# Patient Record
Sex: FEMALE | Race: OTHER | NOT HISPANIC OR LATINO | Employment: STUDENT | URBAN - METROPOLITAN AREA
[De-identification: names, ages, dates, MRNs, and addresses within clinical notes are randomized per-mention and may not be internally consistent; named-entity substitution may affect disease eponyms.]

---
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
## 2021-01-04 ENCOUNTER — OFFICE VISIT (OUTPATIENT)
Dept: INTERNAL MEDICINE | Facility: CLINIC | Age: 21
End: 2021-01-04

## 2021-01-04 VITALS
BODY MASS INDEX: 27.46 KG/M2 | WEIGHT: 155 LBS | DIASTOLIC BLOOD PRESSURE: 62 MMHG | HEIGHT: 63 IN | HEART RATE: 63 BPM | SYSTOLIC BLOOD PRESSURE: 100 MMHG | RESPIRATION RATE: 16 BRPM | TEMPERATURE: 97.3 F | OXYGEN SATURATION: 100 %

## 2021-01-04 DIAGNOSIS — Z30.011 ENCOUNTER FOR INITIAL PRESCRIPTION OF CONTRACEPTIVE PILLS: ICD-10-CM

## 2021-01-04 DIAGNOSIS — Z23 NEED FOR HPV VACCINATION: ICD-10-CM

## 2021-01-04 DIAGNOSIS — Z23 NEED FOR TDAP VACCINATION: ICD-10-CM

## 2021-01-04 DIAGNOSIS — L20.82 FLEXURAL ECZEMA: ICD-10-CM

## 2021-01-04 DIAGNOSIS — Z00.00 ANNUAL PHYSICAL EXAM: Primary | ICD-10-CM

## 2021-01-04 DIAGNOSIS — Z30.41 ENCOUNTER FOR SURVEILLANCE OF CONTRACEPTIVE PILLS: ICD-10-CM

## 2021-01-04 LAB
BILIRUB BLD-MCNC: NEGATIVE MG/DL
CLARITY, POC: CLEAR
COLOR UR: YELLOW
GLUCOSE UR STRIP-MCNC: NEGATIVE MG/DL
KETONES UR QL: NEGATIVE
LEUKOCYTE EST, POC: NEGATIVE
NITRITE UR-MCNC: NEGATIVE MG/ML
PH UR: 7 [PH] (ref 5–8)
PROT UR STRIP-MCNC: NEGATIVE MG/DL
RBC # UR STRIP: NEGATIVE /UL
SP GR UR: 1.02 (ref 1–1.03)
UROBILINOGEN UR QL: NORMAL

## 2021-01-04 PROCEDURE — 81003 URINALYSIS AUTO W/O SCOPE: CPT | Performed by: FAMILY MEDICINE

## 2021-01-04 PROCEDURE — 99395 PREV VISIT EST AGE 18-39: CPT | Performed by: FAMILY MEDICINE

## 2021-01-04 PROCEDURE — 90715 TDAP VACCINE 7 YRS/> IM: CPT | Performed by: FAMILY MEDICINE

## 2021-01-04 PROCEDURE — 90471 IMMUNIZATION ADMIN: CPT | Performed by: FAMILY MEDICINE

## 2021-01-04 PROCEDURE — 90472 IMMUNIZATION ADMIN EACH ADD: CPT | Performed by: FAMILY MEDICINE

## 2021-01-04 PROCEDURE — 90651 9VHPV VACCINE 2/3 DOSE IM: CPT | Performed by: FAMILY MEDICINE

## 2021-01-04 RX ORDER — LEVONORGESTREL / ETHINYL ESTRADIOL AND ETHINYL ESTRADIOL 150-30(84)
1 KIT ORAL DAILY
Qty: 91 EACH | Refills: 3 | Status: SHIPPED | OUTPATIENT
Start: 2021-01-04 | End: 2021-12-15

## 2021-01-04 RX ORDER — PIMECROLIMUS 10 MG/G
CREAM TOPICAL 2 TIMES DAILY
Qty: 60 G | Refills: 1 | Status: SHIPPED | OUTPATIENT
Start: 2021-01-04 | End: 2023-03-27 | Stop reason: SDUPTHER

## 2021-01-04 NOTE — PROGRESS NOTES
Patient Name: Janay Gustafson is a 20 y.o. female presenting for Annual Exam, Contraception, and Eczema      Well Adult Physical   Patient here for a comprehensive physical exam.The patient reports problems - eczema    Do you take any herbs or supplements that were not prescribed by a doctor? no   Are you taking calcium supplements? no   Are you taking aspirin daily? no     History:  Any STD's in the past? none    Janay Mcdaniel 20 y.o. female who presents for an Annual Wellness Visit.  she has a history of   Patient Active Problem List   Diagnosis   • Seasonal allergies   .  she has been doing well with new interval problems.      Health Habits:  Dental Exam. up to date  Eye Exam. up to date  Exercise: 2 times/week.  Current exercise activities include: aerobics, running/ jogging and walking    The following portions of the patient's history were reviewed and updated as appropriate: allergies, current medications, past family history, past medical history, past social history, past surgical history and problem list.    Review of Systems   Constitutional: Negative.    HENT: Negative.    Eyes: Negative.    Respiratory: Negative.    Cardiovascular: Negative.    Gastrointestinal: Negative.    Endocrine: Negative.    Genitourinary: Negative.    Musculoskeletal: Negative.    Skin: Positive for rash.   Allergic/Immunologic: Negative.    Neurological: Negative.    Hematological: Negative.    Psychiatric/Behavioral: Negative.        Patient has no known allergies.      Current Outpatient Medications:   •  Fexofenadine HCl (ALLEGRA PO), Take  by mouth., Disp: , Rfl:   •  Levonorgest-Eth Estrad 91-Day 0.15-0.03 &0.01 MG tablet, Take 1 tablet by mouth Daily., Disp: 91 each, Rfl: 3  •  pimecrolimus (Elidel) 1 % cream, Apply  topically to the appropriate area as directed 2 (Two) Times a Day., Disp: 60 g, Rfl: 1    OBJECTIVE    /62 (BP Location: Left arm, Patient Position: Sitting, Cuff Size: Adult)    "Pulse 63   Temp 97.3 °F (36.3 °C) (Temporal)   Resp 16   Ht 160 cm (63\")   Wt 70.3 kg (155 lb)   SpO2 100%   BMI 27.46 kg/m²     Physical Exam  Vitals signs and nursing note reviewed.   Constitutional:       Appearance: Normal appearance. She is well-developed.   HENT:      Head: Normocephalic and atraumatic.      Right Ear: Tympanic membrane and external ear normal.      Left Ear: Tympanic membrane and external ear normal.      Nose: Nose normal.      Mouth/Throat:      Mouth: Mucous membranes are moist.   Eyes:      General: No scleral icterus.     Extraocular Movements: Extraocular movements intact.      Conjunctiva/sclera: Conjunctivae normal.      Pupils: Pupils are equal, round, and reactive to light.   Neck:      Musculoskeletal: Normal range of motion and neck supple.      Thyroid: No thyromegaly.   Cardiovascular:      Rate and Rhythm: Normal rate and regular rhythm.      Heart sounds: Normal heart sounds. No murmur. No friction rub. No gallop.    Pulmonary:      Effort: Pulmonary effort is normal. No respiratory distress.      Breath sounds: Normal breath sounds. No wheezing or rales.   Abdominal:      General: Bowel sounds are normal.      Palpations: Abdomen is soft.   Musculoskeletal:         General: No deformity.   Lymphadenopathy:      Cervical: No cervical adenopathy.   Skin:     General: Skin is warm and dry.      Findings: Rash (arms with rough texture of cubital fossae with surrounding hypopigmentation) present.   Neurological:      General: No focal deficit present.      Mental Status: She is alert and oriented to person, place, and time.      Cranial Nerves: No cranial nerve deficit.      Motor: No abnormal muscle tone.      Coordination: Coordination normal.      Deep Tendon Reflexes: Reflexes are normal and symmetric. Reflexes normal.   Psychiatric:         Mood and Affect: Mood normal.         Behavior: Behavior normal.         Thought Content: Thought content normal.         Judgment: " Judgment normal.           ASSESSMENT AND PLAN  Update vaccines if indicated.    continue current medications, continue current healthy lifestyle patterns and return for routine annual checkups    Diagnoses and all orders for this visit:    1. Annual physical exam (Primary)  -     POCT urinalysis dipstick, automated    2. Encounter for surveillance of contraceptive pills  -     Levonorgest-Eth Estrad 91-Day 0.15-0.03 &0.01 MG tablet; Take 1 tablet by mouth Daily.  Dispense: 91 each; Refill: 3    3. Flexural eczema  -     pimecrolimus (Elidel) 1 % cream; Apply  topically to the appropriate area as directed 2 (Two) Times a Day.  Dispense: 60 g; Refill: 1    4. Encounter for initial prescription of contraceptive pills  -     Levonorgest-Eth Estrad 91-Day 0.15-0.03 &0.01 MG tablet; Take 1 tablet by mouth Daily.  Dispense: 91 each; Refill: 3    Start HPV series.  Tdap today as well.    [unfilled]    Return in about 1 year (around 1/4/2022).

## 2021-01-15 ENCOUNTER — TELEPHONE (OUTPATIENT)
Dept: INTERNAL MEDICINE | Facility: CLINIC | Age: 21
End: 2021-01-15

## 2021-01-15 NOTE — TELEPHONE ENCOUNTER
PATIENTS MOM STATES THAT  GAVE DAUGHTER A CREAM AT LAST VISIT BUT THEY WERE UNABLE TO FILL BECAUSE THERE WAS A PA THAT NEEDED TO BE DONE. IT WAS NEVER COMPLETED SO THE PHARMACY CANCELLED RX.    PLEASE ADVISE  [phone] MOTHER

## 2021-01-16 NOTE — TELEPHONE ENCOUNTER
Pimecrolius cream approved.  PA# [id number].  Called in info and reapproved med to [facility] [phone].  Patient mother advised.

## 2021-07-01 ENCOUNTER — TELEPHONE (OUTPATIENT)
Dept: INTERNAL MEDICINE | Facility: CLINIC | Age: 21
End: 2021-07-01

## 2021-07-08 ENCOUNTER — CLINICAL SUPPORT (OUTPATIENT)
Dept: INTERNAL MEDICINE | Facility: CLINIC | Age: 21
End: 2021-07-08

## 2021-07-08 DIAGNOSIS — Z23 NEED FOR HPV VACCINATION: Primary | ICD-10-CM

## 2021-07-08 PROCEDURE — 90651 9VHPV VACCINE 2/3 DOSE IM: CPT | Performed by: FAMILY MEDICINE

## 2021-07-08 PROCEDURE — 90471 IMMUNIZATION ADMIN: CPT | Performed by: FAMILY MEDICINE

## 2021-12-14 DIAGNOSIS — Z30.41 ENCOUNTER FOR SURVEILLANCE OF CONTRACEPTIVE PILLS: ICD-10-CM

## 2021-12-14 DIAGNOSIS — Z30.011 ENCOUNTER FOR INITIAL PRESCRIPTION OF CONTRACEPTIVE PILLS: ICD-10-CM

## 2021-12-15 RX ORDER — LEVONORGESTREL AND ETHINYL ESTRADIOL AND ETHINYL ESTRADIOL 150-30(84)
KIT ORAL
Qty: 91 EACH | Refills: 0 | Status: SHIPPED | OUTPATIENT
Start: 2021-12-15 | End: 2022-02-24 | Stop reason: SDUPTHER

## 2022-02-24 ENCOUNTER — OFFICE VISIT (OUTPATIENT)
Dept: INTERNAL MEDICINE | Facility: CLINIC | Age: 22
End: 2022-02-24

## 2022-02-24 VITALS
BODY MASS INDEX: 32.46 KG/M2 | WEIGHT: 183.2 LBS | DIASTOLIC BLOOD PRESSURE: 65 MMHG | HEIGHT: 63 IN | OXYGEN SATURATION: 99 % | HEART RATE: 86 BPM | TEMPERATURE: 97.7 F | SYSTOLIC BLOOD PRESSURE: 105 MMHG | RESPIRATION RATE: 16 BRPM

## 2022-02-24 DIAGNOSIS — Z00.00 ANNUAL PHYSICAL EXAM: Primary | ICD-10-CM

## 2022-02-24 DIAGNOSIS — Z30.011 ENCOUNTER FOR INITIAL PRESCRIPTION OF CONTRACEPTIVE PILLS: ICD-10-CM

## 2022-02-24 DIAGNOSIS — N92.0 MENORRHAGIA WITH REGULAR CYCLE: ICD-10-CM

## 2022-02-24 DIAGNOSIS — Z30.41 ENCOUNTER FOR SURVEILLANCE OF CONTRACEPTIVE PILLS: ICD-10-CM

## 2022-02-24 PROCEDURE — 99395 PREV VISIT EST AGE 18-39: CPT | Performed by: FAMILY MEDICINE

## 2022-02-24 RX ORDER — LEVONORGESTREL / ETHINYL ESTRADIOL AND ETHINYL ESTRADIOL 150-30(84)
1 KIT ORAL DAILY
Qty: 91 EACH | Refills: 3 | Status: SHIPPED | OUTPATIENT
Start: 2022-02-24 | End: 2023-03-09

## 2022-02-24 NOTE — PROGRESS NOTES
Chief Complaint   Patient presents with   • Annual Exam       Patient Name: Janay Gustafson is a 22 y.o. female presenting for Annual Exam      Well Adult Physical   Patient here for a comprehensive physical exam.The patient reports no problems    Do you take any herbs or supplements that were not prescribed by a doctor? no   Are you taking calcium supplements? no   Are you taking aspirin daily? no     History:  Any STD's in the past? none  No prior sexual activity.    Janay Mcdaniel 22 y.o. female who presents for an Annual Wellness Visit.  she has a history of   Patient Active Problem List   Diagnosis   • Seasonal allergies   • Menorrhagia with regular cycle   .  she has been doing well with new interval problems.      Health Habits:  Dental Exam. up to date  Eye Exam. up to date  Exercise: 2 times/week.  Current exercise activities include: cardiovascular workout on exercise equipment and walking      The following portions of the patient's history were reviewed and updated as appropriate: allergies, current medications, past family history, past medical history, past social history, past surgical history and problem list.    Review of Systems   Constitutional: Negative.    HENT: Negative.    Eyes: Negative.    Respiratory: Negative.    Cardiovascular: Negative.    Gastrointestinal: Negative.    Endocrine: Negative.    Genitourinary: Negative.    Musculoskeletal: Negative.    Skin: Negative.    Allergic/Immunologic: Positive for environmental allergies.   Neurological: Negative.    Hematological: Negative.    Psychiatric/Behavioral: Negative.        Patient has no known allergies.      Current Outpatient Medications:   •  Levonorgest-Eth Estrad 91-Day (Ashlyna) 0.15-0.03 &0.01 MG tablet, Take 1 tablet by mouth Daily., Disp: 91 each, Rfl: 3  •  Fexofenadine HCl (ALLEGRA PO), Take  by mouth., Disp: , Rfl:   •  pimecrolimus (Elidel) 1 % cream, Apply  topically to the appropriate area as directed 2  "(Two) Times a Day., Disp: 60 g, Rfl: 1    OBJECTIVE    /65   Pulse 86   Temp 97.7 °F (36.5 °C)   Resp 16   Ht 160 cm (62.99\")   Wt 83.1 kg (183 lb 3.2 oz)   SpO2 99%   BMI 32.46 kg/m²     Physical Exam  Vitals and nursing note reviewed.   Constitutional:       Appearance: Normal appearance. She is well-developed.   HENT:      Head: Normocephalic and atraumatic.      Right Ear: Tympanic membrane and external ear normal.      Left Ear: Tympanic membrane and external ear normal.      Nose: Nose normal.      Mouth/Throat:      Mouth: Mucous membranes are moist.   Eyes:      General: No scleral icterus.     Extraocular Movements: Extraocular movements intact.      Conjunctiva/sclera: Conjunctivae normal.      Pupils: Pupils are equal, round, and reactive to light.   Neck:      Thyroid: No thyromegaly.   Cardiovascular:      Rate and Rhythm: Normal rate and regular rhythm.      Heart sounds: Normal heart sounds. No murmur heard.  No friction rub. No gallop.    Pulmonary:      Effort: Pulmonary effort is normal. No respiratory distress.      Breath sounds: Normal breath sounds. No wheezing or rales.   Abdominal:      General: Bowel sounds are normal.      Palpations: Abdomen is soft.      Tenderness: There is no abdominal tenderness.   Musculoskeletal:         General: No deformity.      Cervical back: Normal range of motion and neck supple.      Right lower leg: No edema.      Left lower leg: No edema.   Lymphadenopathy:      Cervical: No cervical adenopathy.   Skin:     General: Skin is warm and dry.      Findings: No rash.   Neurological:      Mental Status: She is alert and oriented to person, place, and time.      Cranial Nerves: No cranial nerve deficit.      Motor: No abnormal muscle tone.      Coordination: Coordination normal.      Deep Tendon Reflexes: Reflexes are normal and symmetric. Reflexes normal.   Psychiatric:         Behavior: Behavior normal.         Thought Content: Thought content normal. "         Judgment: Judgment normal.              [unfilled]    ASSESSMENT AND PLAN  Diagnoses and all orders for this visit:    1. Annual physical exam (Primary)    2. Encounter for initial prescription of contraceptive pills  -     Levonorgest-Eth Estrad 91-Day (Ashlyna) 0.15-0.03 &0.01 MG tablet; Take 1 tablet by mouth Daily.  Dispense: 91 each; Refill: 3    3. Encounter for surveillance of contraceptive pills  -     Levonorgest-Eth Estrad 91-Day (Ashlyna) 0.15-0.03 &0.01 MG tablet; Take 1 tablet by mouth Daily.  Dispense: 91 each; Refill: 3    4. Menorrhagia with regular cycle      Tdap UTD.  HPV vaccines UTD.  No hx sexual activity; will hold off on pap smear this year.  OCPs refilled; still effective for menorrhagia and dysmenorrhea.    Discussed healthy diet, exercise, cancer screening, immunizations, and preventive care.  Hm tab updated.  Encouraged seat belt use.  No texting while driving. Orders placed as below.     Encouraged covid vaccination if not already done.  Covid vaccination can be scheduled at [email or web address]    continue current medications, continue current healthy lifestyle patterns and return for routine annual checkups    [unfilled]    Return in about 1 year (around 2/24/2023).

## 2023-03-08 DIAGNOSIS — Z30.41 ENCOUNTER FOR SURVEILLANCE OF CONTRACEPTIVE PILLS: ICD-10-CM

## 2023-03-08 DIAGNOSIS — Z30.011 ENCOUNTER FOR INITIAL PRESCRIPTION OF CONTRACEPTIVE PILLS: ICD-10-CM

## 2023-03-08 NOTE — TELEPHONE ENCOUNTER
Rx Refill Note  Requested Prescriptions     Pending Prescriptions Disp Refills    Ashlyna 0.15-0.03 &0.01 MG tablet [Pharmacy Med Name: Ashlyna 0.15-0.03 &0.01 MG Oral Tablet]  0     Sig: Take 1 tablet by mouth once daily      Last office visit with prescribing clinician: 2/24/2022   Last telemedicine visit with prescribing clinician: 3/27/2023   Next office visit with prescribing clinician: 3/27/2023                         Would you like a call back once the refill request has been completed: [] Yes [] No    If the office needs to give you a call back, can they leave a voicemail: [] Yes [] No    Arabella Handy MA  03/08/23, 13:37 EST

## 2023-03-09 RX ORDER — LEVONORGESTREL AND ETHINYL ESTRADIOL AND ETHINYL ESTRADIOL 150-30(84)
KIT ORAL
Qty: 91 EACH | Refills: 0 | Status: SHIPPED | OUTPATIENT
Start: 2023-03-09 | End: 2023-03-27 | Stop reason: SDUPTHER

## 2023-03-27 ENCOUNTER — OFFICE VISIT (OUTPATIENT)
Dept: INTERNAL MEDICINE | Facility: CLINIC | Age: 23
End: 2023-03-27
Payer: COMMERCIAL

## 2023-03-27 VITALS
WEIGHT: 194.4 LBS | TEMPERATURE: 98 F | DIASTOLIC BLOOD PRESSURE: 72 MMHG | HEART RATE: 113 BPM | SYSTOLIC BLOOD PRESSURE: 120 MMHG | BODY MASS INDEX: 35.77 KG/M2 | HEIGHT: 62 IN | OXYGEN SATURATION: 97 % | RESPIRATION RATE: 18 BRPM

## 2023-03-27 DIAGNOSIS — L20.82 FLEXURAL ECZEMA: ICD-10-CM

## 2023-03-27 DIAGNOSIS — Z30.011 ENCOUNTER FOR INITIAL PRESCRIPTION OF CONTRACEPTIVE PILLS: ICD-10-CM

## 2023-03-27 DIAGNOSIS — Z00.00 ANNUAL PHYSICAL EXAM: Primary | ICD-10-CM

## 2023-03-27 DIAGNOSIS — N92.0 MENORRHAGIA WITH REGULAR CYCLE: ICD-10-CM

## 2023-03-27 DIAGNOSIS — J30.2 SEASONAL ALLERGIES: ICD-10-CM

## 2023-03-27 DIAGNOSIS — Z30.41 ENCOUNTER FOR SURVEILLANCE OF CONTRACEPTIVE PILLS: ICD-10-CM

## 2023-03-27 PROCEDURE — 99395 PREV VISIT EST AGE 18-39: CPT | Performed by: FAMILY MEDICINE

## 2023-03-27 RX ORDER — LEVONORGESTREL / ETHINYL ESTRADIOL AND ETHINYL ESTRADIOL 150-30(84)
1 KIT ORAL DAILY
Qty: 91 EACH | Refills: 3 | Status: SHIPPED | OUTPATIENT
Start: 2023-03-27

## 2023-03-27 RX ORDER — PIMECROLIMUS 10 MG/G
CREAM TOPICAL 2 TIMES DAILY
Qty: 60 G | Refills: 2 | Status: SHIPPED | OUTPATIENT
Start: 2023-03-27

## 2023-03-27 NOTE — PROGRESS NOTES
Chief Complaint   Patient presents with   • Annual Exam       Patient Name: Janay Gustafson is a 23 y.o. female presenting for Annual Exam      Well Adult Physical   Patient here for a comprehensive physical exam.The patient reports no problems    Do you take any herbs or supplements that were not prescribed by a doctor? no   Are you taking calcium supplements? no   Are you taking aspirin daily? no     History:  Any STD's in the past? none  No prior sexual activity.    Janay Mcdaniel 23 y.o. female who presents for an Annual Wellness Visit.  she has a history of   Patient Active Problem List   Diagnosis   • Seasonal allergies   • Menorrhagia with regular cycle   • Flexural eczema   .  she has been doing well with new interval problems.      Health Habits:  Dental Exam. up to date  Eye Exam. up to date  Exercise: 2 times/week.  Current exercise activities include: cardiovascular workout on exercise equipment and walking      The following portions of the patient's history were reviewed and updated as appropriate: allergies, current medications, past family history, past medical history, past social history, past surgical history and problem list.    Review of Systems   Constitutional: Negative.    HENT: Negative.    Eyes: Negative.    Respiratory: Negative.    Cardiovascular: Negative.    Gastrointestinal: Negative.    Endocrine: Negative.    Genitourinary: Negative.    Musculoskeletal: Negative.    Skin: Negative.    Allergic/Immunologic: Positive for environmental allergies.   Neurological: Negative.    Hematological: Negative.    Psychiatric/Behavioral: Negative.        Patient has no known allergies.      Current Outpatient Medications:   •  Fexofenadine HCl (ALLEGRA PO), Take  by mouth., Disp: , Rfl:   •  Levonorgest-Eth Estrad 91-Day (Ashlyna) 0.15-0.03 &0.01 MG tablet, Take 1 tablet by mouth Daily., Disp: 91 each, Rfl: 3  •  pimecrolimus (Elidel) 1 % cream, Apply  topically to the appropriate  "area as directed 2 (Two) Times a Day., Disp: 60 g, Rfl: 2    OBJECTIVE    /72   Pulse 113   Temp 98 °F (36.7 °C)   Resp 18   Ht 157.5 cm (62\")   Wt 88.2 kg (194 lb 6.4 oz)   SpO2 97%   BMI 35.56 kg/m²     Physical Exam  Vitals and nursing note reviewed.   Constitutional:       Appearance: Normal appearance. She is well-developed.   HENT:      Head: Normocephalic and atraumatic.      Right Ear: Tympanic membrane and external ear normal.      Left Ear: Tympanic membrane and external ear normal.      Nose: Nose normal.      Mouth/Throat:      Mouth: Mucous membranes are moist.   Eyes:      General: No scleral icterus.     Extraocular Movements: Extraocular movements intact.      Conjunctiva/sclera: Conjunctivae normal.      Pupils: Pupils are equal, round, and reactive to light.   Neck:      Thyroid: No thyromegaly.   Cardiovascular:      Rate and Rhythm: Normal rate and regular rhythm.      Heart sounds: Normal heart sounds. No murmur heard.    No friction rub. No gallop.   Pulmonary:      Effort: Pulmonary effort is normal. No respiratory distress.      Breath sounds: Normal breath sounds. No wheezing or rales.   Abdominal:      General: Bowel sounds are normal.      Palpations: Abdomen is soft.      Tenderness: There is no abdominal tenderness.   Musculoskeletal:         General: No deformity.      Cervical back: Normal range of motion and neck supple.      Right lower leg: No edema.      Left lower leg: No edema.   Lymphadenopathy:      Cervical: No cervical adenopathy.   Skin:     General: Skin is warm and dry.      Findings: No rash.   Neurological:      General: No focal deficit present.      Mental Status: She is alert and oriented to person, place, and time.      Cranial Nerves: No cranial nerve deficit.      Motor: No abnormal muscle tone.      Coordination: Coordination normal.      Deep Tendon Reflexes: Reflexes are normal and symmetric. Reflexes normal.   Psychiatric:         Mood and Affect: " Mood normal.         Behavior: Behavior normal.         Thought Content: Thought content normal.         Judgment: Judgment normal.         ASSESSMENT AND PLAN  Diagnoses and all orders for this visit:    1. Annual physical exam (Primary)    2. Menorrhagia with regular cycle    3. Seasonal allergies    4. Flexural eczema  -     pimecrolimus (Elidel) 1 % cream; Apply  topically to the appropriate area as directed 2 (Two) Times a Day.  Dispense: 60 g; Refill: 2    5. Encounter for initial prescription of contraceptive pills  -     Levonorgest-Eth Estrad 91-Day (Ashlyna) 0.15-0.03 &0.01 MG tablet; Take 1 tablet by mouth Daily.  Dispense: 91 each; Refill: 3    6. Encounter for surveillance of contraceptive pills  -     Levonorgest-Eth Estrad 91-Day (Ashlyna) 0.15-0.03 &0.01 MG tablet; Take 1 tablet by mouth Daily.  Dispense: 91 each; Refill: 3      Tdap UTD.  HPV vaccines UTD.  No hx sexual activity; will hold off on pap smear this year.  OCPs refilled; still effective for menorrhagia and dysmenorrhea.    Elidel refilled for eczema.    CONRAD.  Continue antihistamine.  Add nasal if/when needed.    Discussed healthy diet, exercise, cancer screening, immunizations, and preventive care.  Hm tab updated.  Encouraged seat belt use.  No texting while driving. Orders placed as below.     Encouraged covid vaccination if not already done.  Covid vaccination can be scheduled at [email or web address]    continue current medications, continue current healthy lifestyle patterns and return for routine annual checkups      Return in about 1 year (around 3/27/2024).

## 2024-03-28 ENCOUNTER — OFFICE VISIT (OUTPATIENT)
Dept: INTERNAL MEDICINE | Facility: CLINIC | Age: 24
End: 2024-03-28
Payer: COMMERCIAL

## 2024-03-28 VITALS
TEMPERATURE: 97.3 F | BODY MASS INDEX: 37.54 KG/M2 | DIASTOLIC BLOOD PRESSURE: 68 MMHG | HEART RATE: 117 BPM | HEIGHT: 62 IN | OXYGEN SATURATION: 97 % | SYSTOLIC BLOOD PRESSURE: 92 MMHG | WEIGHT: 204 LBS

## 2024-03-28 DIAGNOSIS — E04.9 ENLARGED THYROID: ICD-10-CM

## 2024-03-28 DIAGNOSIS — Z30.011 ENCOUNTER FOR INITIAL PRESCRIPTION OF CONTRACEPTIVE PILLS: ICD-10-CM

## 2024-03-28 DIAGNOSIS — L20.82 FLEXURAL ECZEMA: ICD-10-CM

## 2024-03-28 DIAGNOSIS — Z30.41 ENCOUNTER FOR SURVEILLANCE OF CONTRACEPTIVE PILLS: ICD-10-CM

## 2024-03-28 DIAGNOSIS — Z00.00 ANNUAL PHYSICAL EXAM: Primary | ICD-10-CM

## 2024-03-28 PROCEDURE — 99395 PREV VISIT EST AGE 18-39: CPT | Performed by: FAMILY MEDICINE

## 2024-03-28 RX ORDER — PIMECROLIMUS 10 MG/G
CREAM TOPICAL 2 TIMES DAILY
Qty: 60 G | Refills: 2 | Status: SHIPPED | OUTPATIENT
Start: 2024-03-28

## 2024-03-28 RX ORDER — LEVONORGESTREL / ETHINYL ESTRADIOL AND ETHINYL ESTRADIOL 150-30(84)
1 KIT ORAL DAILY
Qty: 91 EACH | Refills: 3 | Status: SHIPPED | OUTPATIENT
Start: 2024-03-28

## 2024-03-28 NOTE — PROGRESS NOTES
Chief Complaint   Patient presents with    Annual Exam       Patient Name: Janay Gustafson is a 24 y.o. female presenting for Annual Exam      Well Adult Physical   Patient here for a comprehensive physical exam.The patient reports no problems    Do you take any herbs or supplements that were not prescribed by a doctor? no   Are you taking calcium supplements? no   Are you taking aspirin daily? no     History:  Any STD's in the past? none  Janay Mcdaniel 24 y.o. female who presents for an Annual Wellness Visit.  she has a history of   Patient Active Problem List   Diagnosis    Seasonal allergies    Menorrhagia with regular cycle    Flexural eczema   .  she has been doing well with new interval problems.      Health Habits:  Dental Exam. up to date  Eye Exam. up to date  Exercise: 3 times/week.  Current exercise activities include: walking      The following portions of the patient's history were reviewed and updated as appropriate: allergies, current medications, past family history, past medical history, past social history, past surgical history and problem list.    Review of Systems   Constitutional: Negative.    HENT: Negative.     Eyes: Negative.    Respiratory: Negative.     Cardiovascular: Negative.    Gastrointestinal: Negative.    Endocrine: Negative.    Genitourinary: Negative.    Musculoskeletal: Negative.    Skin: Negative.    Allergic/Immunologic: Positive for environmental allergies.   Neurological: Negative.    Hematological: Negative.    Psychiatric/Behavioral: Negative.         Patient has no known allergies.      Current Outpatient Medications:     Fexofenadine HCl (ALLEGRA PO), Take  by mouth., Disp: , Rfl:     Levonorgest-Eth Estrad 91-Day ([facility]) 0.15-0.03 &0.01 MG, Take 1 tablet by mouth Daily., Disp: 91 each, Rfl: 3    pimecrolimus (Elidel) 1 % cream, Apply  topically to the appropriate area as directed 2 (Two) Times a Day., Disp: 60 g, Rfl: 2    OBJECTIVE    BP 92/68 (BP  "Location: Left arm, Patient Position: Sitting, Cuff Size: Large Adult)   Pulse 117   Temp 97.3 °F (36.3 °C) (Infrared)   Ht 157.5 cm (62.01\")   Wt 92.5 kg (204 lb)   SpO2 97%   BMI 37.30 kg/m²     Physical Exam  Vitals and nursing note reviewed.   Constitutional:       Appearance: Normal appearance. She is well-developed.   HENT:      Head: Normocephalic and atraumatic.      Right Ear: Tympanic membrane and external ear normal.      Left Ear: Tympanic membrane and external ear normal.      Nose: Nose normal.      Mouth/Throat:      Mouth: Mucous membranes are moist.   Eyes:      General: No scleral icterus.     Extraocular Movements: Extraocular movements intact.      Conjunctiva/sclera: Conjunctivae normal.      Pupils: Pupils are equal, round, and reactive to light.   Neck:      Thyroid: Thyromegaly present.   Cardiovascular:      Rate and Rhythm: Normal rate and regular rhythm.      Heart sounds: Normal heart sounds. No murmur heard.     No friction rub. No gallop.   Pulmonary:      Effort: Pulmonary effort is normal. No respiratory distress.      Breath sounds: Normal breath sounds. No wheezing or rales.   Abdominal:      General: Bowel sounds are normal.      Palpations: Abdomen is soft.      Tenderness: There is no abdominal tenderness.   Musculoskeletal:         General: No deformity.      Cervical back: Normal range of motion and neck supple.      Right lower leg: No edema.      Left lower leg: No edema.   Lymphadenopathy:      Cervical: No cervical adenopathy.   Skin:     General: Skin is warm and dry.      Findings: No rash.   Neurological:      General: No focal deficit present.      Mental Status: She is alert and oriented to person, place, and time.      Cranial Nerves: No cranial nerve deficit.      Motor: No abnormal muscle tone.      Coordination: Coordination normal.      Deep Tendon Reflexes: Reflexes are normal and symmetric. Reflexes normal.   Psychiatric:         Mood and Affect: Mood " normal.         Behavior: Behavior normal.         Thought Content: Thought content normal.         Judgment: Judgment normal.         ASSESSMENT AND PLAN  Diagnoses and all orders for this visit:    1. Annual physical exam (Primary)  -     CBC & Differential  -     Comprehensive Metabolic Panel  -     Hemoglobin A1c  -     Lipid Panel With / Chol / HDL Ratio  -     Vitamin B12  -     Vitamin D,25-Hydroxy    2. Encounter for initial prescription of contraceptive pills  -     Levonorgest-Eth Estrad 91-Day (Ashlyna) 0.15-0.03 &0.01 MG; Take 1 tablet by mouth Daily.  Dispense: 91 each; Refill: 3    3. Encounter for surveillance of contraceptive pills  -     Levonorgest-Eth Estrad 91-Day (Ashlyna) 0.15-0.03 &0.01 MG; Take 1 tablet by mouth Daily.  Dispense: 91 each; Refill: 3    4. Flexural eczema  -     pimecrolimus (Elidel) 1 % cream; Apply  topically to the appropriate area as directed 2 (Two) Times a Day.  Dispense: 60 g; Refill: 2    5. Enlarged thyroid  -     US Thyroid; Future  -     TSH  -     T4, Free      Tdap UTD.  HPV vaccines UTD.  No hx sexual activity; will hold off on pap smear this year.  OCPs refilled; still effective for menorrhagia and dysmenorrhea.  Pelvic exam next year.     Elidel refilled for eczema.     CONRAD.  Continue antihistamine.  Add nasal if/when needed.    Thyromegaly.  Asymptomatic.  Check labs and thyroid u/s.    Discussed healthy diet, exercise, cancer screening, immunizations, and preventive care.  Hm tab updated.  Encouraged seat belt use.  No texting while driving. Orders placed as below.     Encouraged covid vaccination if not already done.  Covid vaccination can be scheduled at [email or web address]    continue current medications and return for routine annual checkups      Return in about 1 year (around 3/28/2025) for Annual physical.

## 2024-03-29 LAB
25(OH)D3+25(OH)D2 SERPL-MCNC: 14.2 NG/ML (ref 30–100)
ALBUMIN SERPL-MCNC: 4.5 G/DL (ref 3.5–5.2)
ALBUMIN/GLOB SERPL: 1.6 G/DL
ALP SERPL-CCNC: 66 U/L (ref 39–117)
ALT SERPL-CCNC: 14 U/L (ref 1–33)
AST SERPL-CCNC: 15 U/L (ref 1–32)
BASOPHILS # BLD AUTO: 0.07 10*3/MM3 (ref 0–0.2)
BASOPHILS NFR BLD AUTO: 1 % (ref 0–1.5)
BILIRUB SERPL-MCNC: 0.2 MG/DL (ref 0–1.2)
BUN SERPL-MCNC: 9 MG/DL (ref 6–20)
BUN/CREAT SERPL: 12.2 (ref 7–25)
CALCIUM SERPL-MCNC: 9.6 MG/DL (ref 8.6–10.5)
CHLORIDE SERPL-SCNC: 104 MMOL/L (ref 98–107)
CHOLEST SERPL-MCNC: 182 MG/DL (ref 0–200)
CHOLEST/HDLC SERPL: 4.23 {RATIO}
CO2 SERPL-SCNC: 24 MMOL/L (ref 22–29)
CREAT SERPL-MCNC: 0.74 MG/DL (ref 0.57–1)
EGFRCR SERPLBLD CKD-EPI 2021: 116.8 ML/MIN/1.73
EOSINOPHIL # BLD AUTO: 0.09 10*3/MM3 (ref 0–0.4)
EOSINOPHIL NFR BLD AUTO: 1.3 % (ref 0.3–6.2)
ERYTHROCYTE [DISTWIDTH] IN BLOOD BY AUTOMATED COUNT: 12 % (ref 12.3–15.4)
GLOBULIN SER CALC-MCNC: 2.9 GM/DL
GLUCOSE SERPL-MCNC: 74 MG/DL (ref 65–99)
HBA1C MFR BLD: 5.1 % (ref 4.8–5.6)
HCT VFR BLD AUTO: 41.5 % (ref 34–46.6)
HDLC SERPL-MCNC: 43 MG/DL (ref 40–60)
HGB BLD-MCNC: 13.7 G/DL (ref 12–15.9)
IMM GRANULOCYTES # BLD AUTO: 0.06 10*3/MM3 (ref 0–0.05)
IMM GRANULOCYTES NFR BLD AUTO: 0.8 % (ref 0–0.5)
LDLC SERPL CALC-MCNC: 116 MG/DL (ref 0–100)
LYMPHOCYTES # BLD AUTO: 2.15 10*3/MM3 (ref 0.7–3.1)
LYMPHOCYTES NFR BLD AUTO: 30 % (ref 19.6–45.3)
MCH RBC QN AUTO: 29 PG (ref 26.6–33)
MCHC RBC AUTO-ENTMCNC: 33 G/DL (ref 31.5–35.7)
MCV RBC AUTO: 87.7 FL (ref 79–97)
MONOCYTES # BLD AUTO: 0.42 10*3/MM3 (ref 0.1–0.9)
MONOCYTES NFR BLD AUTO: 5.9 % (ref 5–12)
NEUTROPHILS # BLD AUTO: 4.37 10*3/MM3 (ref 1.7–7)
NEUTROPHILS NFR BLD AUTO: 61 % (ref 42.7–76)
NRBC BLD AUTO-RTO: 0 /100 WBC (ref 0–0.2)
PLATELET # BLD AUTO: 328 10*3/MM3 (ref 140–450)
POTASSIUM SERPL-SCNC: 4.4 MMOL/L (ref 3.5–5.2)
PROT SERPL-MCNC: 7.4 G/DL (ref 6–8.5)
RBC # BLD AUTO: 4.73 10*6/MM3 (ref 3.77–5.28)
SODIUM SERPL-SCNC: 139 MMOL/L (ref 136–145)
T4 FREE SERPL-MCNC: 1.35 NG/DL (ref 0.93–1.7)
TRIGL SERPL-MCNC: 127 MG/DL (ref 0–150)
TSH SERPL DL<=0.005 MIU/L-ACNC: 1.69 UIU/ML (ref 0.27–4.2)
VIT B12 SERPL-MCNC: 358 PG/ML (ref 211–946)
VLDLC SERPL CALC-MCNC: 23 MG/DL (ref 5–40)
WBC # BLD AUTO: 7.16 10*3/MM3 (ref 3.4–10.8)

## 2024-04-01 ENCOUNTER — TELEPHONE (OUTPATIENT)
Dept: INTERNAL MEDICINE | Facility: CLINIC | Age: 24
End: 2024-04-01
Payer: COMMERCIAL

## 2024-04-01 NOTE — TELEPHONE ENCOUNTER
Lvm to call back regarding results     Relay:  Please call the patient regarding her result.  The vitamin D level is quite low.  Start vitamin D3 2000 IU daily.

## 2024-04-12 ENCOUNTER — HOSPITAL ENCOUNTER (OUTPATIENT)
Dept: ULTRASOUND IMAGING | Facility: HOSPITAL | Age: 24
Discharge: HOME | End: 2024-04-12
Admitting: FAMILY MEDICINE
Payer: COMMERCIAL

## 2024-04-12 DIAGNOSIS — E04.9 ENLARGED THYROID: ICD-10-CM

## 2024-04-12 PROCEDURE — 76536 US EXAM OF HEAD AND NECK: CPT

## 2024-04-17 NOTE — TELEPHONE ENCOUNTER
CALLED PATIENT AND TOLD HER WE DO NOT HAVE THE HPV VACCINE AT THIS TIME. I ASKED HER TO CALL BACK IN A WEEK TO SEE IF IT HAS COME.   None available Yes

## 2025-02-28 ENCOUNTER — TELEPHONE (OUTPATIENT)
Dept: INTERNAL MEDICINE | Facility: CLINIC | Age: 25
End: 2025-02-28
Payer: COMMERCIAL

## 2025-03-02 DIAGNOSIS — Z00.00 ANNUAL PHYSICAL EXAM: Primary | ICD-10-CM

## 2025-04-21 ENCOUNTER — OFFICE VISIT (OUTPATIENT)
Dept: INTERNAL MEDICINE | Facility: CLINIC | Age: 25
End: 2025-04-21
Payer: COMMERCIAL

## 2025-04-21 VITALS
DIASTOLIC BLOOD PRESSURE: 78 MMHG | HEIGHT: 62 IN | WEIGHT: 221 LBS | HEART RATE: 57 BPM | OXYGEN SATURATION: 100 % | SYSTOLIC BLOOD PRESSURE: 118 MMHG | BODY MASS INDEX: 40.67 KG/M2 | TEMPERATURE: 98.7 F

## 2025-04-21 DIAGNOSIS — L20.82 FLEXURAL ECZEMA: ICD-10-CM

## 2025-04-21 DIAGNOSIS — Z30.41 ENCOUNTER FOR SURVEILLANCE OF CONTRACEPTIVE PILLS: ICD-10-CM

## 2025-04-21 DIAGNOSIS — F32.1 MAJOR DEPRESSIVE DISORDER, SINGLE EPISODE, MODERATE: ICD-10-CM

## 2025-04-21 DIAGNOSIS — Z30.011 ENCOUNTER FOR INITIAL PRESCRIPTION OF CONTRACEPTIVE PILLS: ICD-10-CM

## 2025-04-21 DIAGNOSIS — Z00.00 ANNUAL PHYSICAL EXAM: Primary | ICD-10-CM

## 2025-04-21 RX ORDER — LEVONORGESTREL / ETHINYL ESTRADIOL AND ETHINYL ESTRADIOL 150-30(84)
1 KIT ORAL DAILY
Qty: 91 EACH | Refills: 3 | Status: SHIPPED | OUTPATIENT
Start: 2025-04-21

## 2025-04-21 RX ORDER — PIMECROLIMUS 10 MG/G
CREAM TOPICAL 2 TIMES DAILY
Qty: 60 G | Refills: 2 | Status: SHIPPED | OUTPATIENT
Start: 2025-04-21

## 2025-04-21 RX ORDER — ESCITALOPRAM OXALATE 10 MG/1
10 TABLET ORAL DAILY
Qty: 30 TABLET | Refills: 1 | Status: SHIPPED | OUTPATIENT
Start: 2025-04-21

## 2025-04-21 NOTE — PROGRESS NOTES
Chief Complaint   Patient presents with    Annual Exam    Gynecologic Exam       Patient Name: Janay Gustafson is a 25 y.o. female presenting for Annual Exam and Gynecologic Exam      Well Adult Physical   Patient here for a comprehensive physical exam.The patient reports problems - Depression symptoms.  Lack of motivation.  Fatigue.  Anxiety.  Sadness.     Do you take any herbs or supplements that were not prescribed by a doctor? no   Are you taking calcium supplements? no   Are you taking aspirin daily? no     History:  Any STD's in the past? none  Janay Mcdaniel 25 y.o. female who presents for an Annual Wellness Visit.  she has a history of   Patient Active Problem List   Diagnosis    Seasonal allergies    Menorrhagia with regular cycle    Flexural eczema    Major depressive disorder, single episode, moderate   .  she has been doing well with new interval problems.      Health Habits:  Dental Exam. up to date  Eye Exam. up to date  Exercise: 3 times/week.  Current exercise activities include: walking      The following portions of the patient's history were reviewed and updated as appropriate: allergies, current medications, past family history, past medical history, past social history, past surgical history and problem list.    Review of Systems   Constitutional: Negative.    HENT: Negative.     Eyes: Negative.    Respiratory: Negative.     Cardiovascular: Negative.    Gastrointestinal: Negative.    Endocrine: Negative.    Genitourinary: Negative.    Musculoskeletal: Negative.    Skin: Negative.    Allergic/Immunologic: Positive for environmental allergies.   Neurological: Negative.    Hematological: Negative.    Psychiatric/Behavioral: Negative.         Patient has no known allergies.      Current Outpatient Medications:     Fexofenadine HCl (ALLEGRA PO), Take  by mouth., Disp: , Rfl:     Levonorgest-Eth Estrad 91-Day ([facility]) 0.15-0.03 &0.01 MG, Take 1 tablet by mouth Daily., Disp: 91 each,  "Rfl: 3    pimecrolimus (Elidel) 1 % cream, Apply  topically to the appropriate area as directed 2 (Two) Times a Day., Disp: 60 g, Rfl: 2    escitalopram (Lexapro) 10 MG tablet, Take 1 tablet by mouth Daily., Disp: 30 tablet, Rfl: 1    OBJECTIVE    /78 (BP Location: Left arm, Patient Position: Sitting, Cuff Size: Adult)   Pulse 57   Temp 98.7 °F (37.1 °C) (Infrared)   Ht 157.5 cm (62.01\")   Wt 100 kg (221 lb)   SpO2 100%   BMI 40.41 kg/m²     @Class 3 Severe Obesity (BMI >=40). Obesity-related health conditions include the following: none. Obesity is unchanged. BMI is is above average; BMI management plan is completed. We discussed portion control and increasing exercise.       Physical Exam  Vitals and nursing note reviewed.   Constitutional:       Appearance: Normal appearance. She is well-developed.   HENT:      Head: Normocephalic and atraumatic.      Right Ear: Tympanic membrane and external ear normal.      Left Ear: Tympanic membrane and external ear normal.      Nose: Nose normal.      Mouth/Throat:      Mouth: Mucous membranes are moist.      Pharynx: Oropharynx is clear.   Eyes:      General: No scleral icterus.        Right eye: No discharge.         Left eye: No discharge.      Extraocular Movements: Extraocular movements intact.      Conjunctiva/sclera: Conjunctivae normal.      Pupils: Pupils are equal, round, and reactive to light.   Neck:      Thyroid: No thyromegaly.      Vascular: No carotid bruit.   Cardiovascular:      Rate and Rhythm: Normal rate and regular rhythm.      Heart sounds: Normal heart sounds. No murmur heard.     No friction rub. No gallop.   Pulmonary:      Effort: Pulmonary effort is normal. No respiratory distress.      Breath sounds: Normal breath sounds. No wheezing or rales.   Abdominal:      General: Bowel sounds are normal.      Palpations: Abdomen is soft. There is no mass.      Tenderness: There is no abdominal tenderness.      Hernia: No hernia is present. "   Genitourinary:     Exam position: Supine.      Labia:         Right: No lesion.         Left: No lesion.       Vagina: Normal. No vaginal discharge.      Cervix: No cervical motion tenderness, discharge or friability.      Adnexa:         Right: No mass, tenderness or fullness.          Left: No mass, tenderness or fullness.     Musculoskeletal:         General: Normal range of motion.      Cervical back: Neck supple. No rigidity.      Right lower leg: No edema.      Left lower leg: No edema.   Lymphadenopathy:      Cervical: No cervical adenopathy.   Skin:     General: Skin is warm and dry.      Findings: No lesion (No skin lesions.).   Neurological:      General: No focal deficit present.      Mental Status: She is alert and oriented to person, place, and time.      Cranial Nerves: No cranial nerve deficit.      Deep Tendon Reflexes: Reflexes are normal and symmetric. Reflexes normal.   Psychiatric:         Mood and Affect: Mood normal.         Behavior: Behavior normal.         Common labs          3/24/2025    08:20   Common Labs   Glucose 78    BUN 8    Creatinine 0.74    Sodium 139    Potassium 4.4    Chloride 102    Calcium 9.7    Albumin 4.1    Total Bilirubin 0.4    Alkaline Phosphatase 64    AST (SGOT) 16    ALT (SGPT) 14    WBC 9.83    Hemoglobin 14.3    Hematocrit 44.6    Platelets 334    Total Cholesterol 203    Triglycerides 125    HDL Cholesterol 43    LDL Cholesterol  137    Hemoglobin A1C 5.00         ASSESSMENT AND PLAN  Diagnoses and all orders for this visit:    1. Annual physical exam (Primary)  -     IGP, Aptima HPV, CtNg Age Gdln; Future    2. Encounter for initial prescription of contraceptive pills  -     Levonorgest-Eth Estrad 91-Day (Ashlyna) 0.15-0.03 &0.01 MG; Take 1 tablet by mouth Daily.  Dispense: 91 each; Refill: 3    3. Encounter for surveillance of contraceptive pills  -     Levonorgest-Eth Estrad 91-Day (Ashlyna) 0.15-0.03 &0.01 MG; Take 1 tablet by mouth Daily.  Dispense: 91  each; Refill: 3    4. Flexural eczema  -     pimecrolimus (Elidel) 1 % cream; Apply  topically to the appropriate area as directed 2 (Two) Times a Day.  Dispense: 60 g; Refill: 2    5. Major depressive disorder, single episode, moderate  -     escitalopram (Lexapro) 10 MG tablet; Take 1 tablet by mouth Daily.  Dispense: 30 tablet; Refill: 1      Tdap UTD.  HPV vaccines UTD.  Pelvic exam and pap smear.  OCPs refilled; still effective for menorrhagia and dysmenorrhea.       Elidel refilled for eczema.     CONRAD.  Continue antihistamine.  Add nasal steroid if/when needed.    Depression with anxiety.  Start escitalopram 10 mg.  Consider psychotherapy.  Lifestyle measures.  F/U 4-6 weeks.     Discussed healthy diet, exercise, cancer screening, immunizations, and preventive care.  Hm tab updated.  Encouraged seat belt use.  No texting while driving. Orders placed as below.     Encouraged covid vaccination if not already done.  Covid vaccination can be scheduled at [email or web address]    begin progressive daily aerobic exercise program, continue current medications, and return for routine annual checkups      Return in about 4 weeks (around 5/19/2025).

## 2025-04-28 LAB
AGE GDLN ACOG TESTING: NORMAL
C TRACH RRNA CVX QL NAA+PROBE: NEGATIVE
CONV .: NORMAL
CYTOLOGIST CVX/VAG CYTO: NORMAL
CYTOLOGY CVX/VAG DOC CYTO: NORMAL
CYTOLOGY CVX/VAG DOC THIN PREP: NORMAL
DX ICD CODE: NORMAL
N GONORRHOEA RRNA CVX QL NAA+PROBE: NEGATIVE
OTHER STN SPEC: NORMAL
SERVICE CMNT-IMP: NORMAL
STAT OF ADQ CVX/VAG CYTO-IMP: NORMAL

## 2025-06-02 ENCOUNTER — TELEMEDICINE (OUTPATIENT)
Dept: INTERNAL MEDICINE | Facility: CLINIC | Age: 25
End: 2025-06-02
Payer: COMMERCIAL

## 2025-06-02 VITALS — HEIGHT: 62 IN | BODY MASS INDEX: 40.67 KG/M2 | WEIGHT: 221 LBS

## 2025-06-02 DIAGNOSIS — F32.1 MAJOR DEPRESSIVE DISORDER, SINGLE EPISODE, MODERATE: Primary | ICD-10-CM

## 2025-06-02 PROCEDURE — 99213 OFFICE O/P EST LOW 20 MIN: CPT | Performed by: FAMILY MEDICINE

## 2025-06-02 RX ORDER — ESCITALOPRAM OXALATE 20 MG/1
20 TABLET ORAL DAILY
Qty: 90 TABLET | Refills: 0 | Status: SHIPPED | OUTPATIENT
Start: 2025-06-02

## 2025-06-02 RX ORDER — ESCITALOPRAM OXALATE 20 MG/1
20 TABLET ORAL DAILY
Qty: 30 TABLET | Refills: 2 | Status: SHIPPED | OUTPATIENT
Start: 2025-06-02 | End: 2025-06-02 | Stop reason: SDUPTHER

## 2025-06-02 NOTE — PROGRESS NOTES
Subjective   Janay Mcdaniel is a 25 y.o. female presenting today for follow up of   Chief Complaint   Patient presents with    Depression     4 week f/u on lexapro    Anxiety   You have chosen to receive care through a telehealth visit.  Do you consent to use a video/audio connection for your medical care today? Yes  Pt is at home in [place], [place].  I am at my office in [place], [place].  Total visit time 16 minutes.     Depression    Symptoms: depressed mood      Symptoms: no suicidal ideas      Nighttime awakenings:     PMH Includes: depression    Anxiety  Initial visit:     PMH includes: depression      Symptoms: depressed mood      Symptoms: no suicidal ideas         Pt presents for 6 week f/u on depression.  She is tolerating escitalopram 10 mg.  She says she has been more sweaty, but isn't sure if this is due to the warmer weather and packing to move.  She is feeling just a little better.  Denies SI.    Patient Active Problem List   Diagnosis    Seasonal allergies    Menorrhagia with regular cycle    Flexural eczema    Major depressive disorder, single episode, moderate       Current Outpatient Medications on File Prior to Visit   Medication Sig    escitalopram (Lexapro) 10 MG tablet Take 1 tablet by mouth Daily.    Fexofenadine HCl (ALLEGRA PO) Take  by mouth.    Levonorgest-Eth Estrad 91-Day (Ashlyna) 0.15-0.03 &0.01 MG Take 1 tablet by mouth Daily.    pimecrolimus (Elidel) 1 % cream Apply  topically to the appropriate area as directed 2 (Two) Times a Day.     No current facility-administered medications on file prior to visit.          The following portions of the patient's history were reviewed and updated as appropriate: allergies, current medications, past family history, past medical history, past social history, past surgical history and problem list.    Review of Systems   Constitutional:  Positive for diaphoresis.   Psychiatric/Behavioral:  Positive for depressed mood and stress. Negative for self-injury  "and suicidal ideas.        Objective   Vitals:    06/02/25 1423   Weight: 100 kg (221 lb)   Height: 157.5 cm (62.01\")       BP Readings from Last 3 Encounters:   04/21/25 118/78   03/28/24 92/68   03/27/23 120/72        Wt Readings from Last 3 Encounters:   06/02/25 100 kg (221 lb)   04/21/25 100 kg (221 lb)   03/28/24 92.5 kg (204 lb)        Body mass index is 40.41 kg/m².  Nursing notes and vitals reviewed.    Physical Exam  Vitals and nursing note reviewed.   Constitutional:       Appearance: Normal appearance.   HENT:      Head: Normocephalic and atraumatic.      Mouth/Throat:      Mouth: Mucous membranes are moist.   Eyes:      Extraocular Movements: Extraocular movements intact.      Conjunctiva/sclera: Conjunctivae normal.   Pulmonary:      Effort: Pulmonary effort is normal. No respiratory distress.   Musculoskeletal:      Cervical back: Neck supple. No rigidity.   Skin:     Coloration: Skin is not pale.      Findings: No erythema.   Neurological:      General: No focal deficit present.      Mental Status: She is alert and oriented to person, place, and time.   Psychiatric:         Mood and Affect: Mood normal.         Behavior: Behavior normal.         No results found for this or any previous visit (from the past 4 weeks).      Assessment & Plan   Diagnoses and all orders for this visit:    1. Major depressive disorder, single episode, moderate (Primary)      Improved but not optimally controlled.  Options discussed.  Increase escitalopram from 10 to 20 mg daily.  Lifestyle measures.    She is moving to [place] later this week and will find a new provider there.      Medications, including side effects, were discussed with the patient. Patient verbalized understanding.  The plan of care was discussed. All questions were answered. Patient verbalized understanding.      No follow-ups on file.              " day(s)